# Patient Record
Sex: FEMALE | Race: WHITE | NOT HISPANIC OR LATINO | Employment: FULL TIME | URBAN - METROPOLITAN AREA
[De-identification: names, ages, dates, MRNs, and addresses within clinical notes are randomized per-mention and may not be internally consistent; named-entity substitution may affect disease eponyms.]

---

## 2022-12-07 ENCOUNTER — TELEPHONE (OUTPATIENT)
Dept: SURGERY | Facility: CLINIC | Age: 47
End: 2022-12-07
Payer: COMMERCIAL

## 2022-12-07 NOTE — TELEPHONE ENCOUNTER
Spoke with patient and appointment scheduled per referral for hemorrhoids,possible banding. Appointment details confirmed verbally over phone. Reminder slip placed in mail.

## 2022-12-19 ENCOUNTER — OFFICE VISIT (OUTPATIENT)
Dept: SURGERY | Facility: CLINIC | Age: 47
End: 2022-12-19
Payer: COMMERCIAL

## 2022-12-19 VITALS
BODY MASS INDEX: 41.01 KG/M2 | HEIGHT: 67 IN | DIASTOLIC BLOOD PRESSURE: 80 MMHG | HEART RATE: 86 BPM | WEIGHT: 261.31 LBS | SYSTOLIC BLOOD PRESSURE: 110 MMHG

## 2022-12-19 DIAGNOSIS — K62.5 RECTAL BLEEDING: ICD-10-CM

## 2022-12-19 DIAGNOSIS — Z86.010 HISTORY OF COLON POLYPS: Primary | ICD-10-CM

## 2022-12-19 PROCEDURE — 99999 PR PBB SHADOW E&M-EST. PATIENT-LVL IV: CPT | Mod: PBBFAC,,, | Performed by: COLON & RECTAL SURGERY

## 2022-12-19 PROCEDURE — 46600 DIAGNOSTIC ANOSCOPY SPX: CPT | Mod: S$PBB,,, | Performed by: COLON & RECTAL SURGERY

## 2022-12-19 PROCEDURE — 99202 OFFICE O/P NEW SF 15 MIN: CPT | Mod: 25,S$PBB,, | Performed by: COLON & RECTAL SURGERY

## 2022-12-19 PROCEDURE — 46600 DIAGNOSTIC ANOSCOPY SPX: CPT | Mod: PBBFAC | Performed by: COLON & RECTAL SURGERY

## 2022-12-19 RX ORDER — SPIRONOLACTONE 100 MG/1
100 TABLET, FILM COATED ORAL
COMMUNITY
Start: 2022-10-13

## 2022-12-19 RX ORDER — LIOTHYRONINE SODIUM 5 UG/1
5 TABLET ORAL 2 TIMES DAILY
COMMUNITY
Start: 2022-10-13

## 2022-12-19 RX ORDER — DICLOFENAC POTASSIUM 50 MG/1
50 TABLET, FILM COATED ORAL 2 TIMES DAILY
COMMUNITY
Start: 2022-10-13

## 2022-12-19 RX ORDER — BUPROPION HYDROCHLORIDE 150 MG/1
150 TABLET ORAL EVERY MORNING
COMMUNITY
Start: 2022-10-13

## 2022-12-19 RX ORDER — CYCLOBENZAPRINE HCL 5 MG
5 TABLET ORAL 2 TIMES DAILY
COMMUNITY
Start: 2022-10-13

## 2022-12-19 RX ORDER — DICLOFENAC POTASSIUM 50 MG/1
TABLET, FILM COATED ORAL
COMMUNITY

## 2022-12-19 RX ORDER — DOCUSATE SODIUM 100 MG/1
100 CAPSULE, LIQUID FILLED ORAL 2 TIMES DAILY
COMMUNITY

## 2022-12-19 RX ORDER — DICYCLOMINE HYDROCHLORIDE 10 MG/1
10 CAPSULE ORAL 2 TIMES DAILY PRN
COMMUNITY
Start: 2022-11-21

## 2022-12-19 RX ORDER — LEVOCETIRIZINE DIHYDROCHLORIDE 5 MG/1
5 TABLET, FILM COATED ORAL NIGHTLY
COMMUNITY

## 2022-12-19 RX ORDER — BUPROPION HYDROCHLORIDE 150 MG/1
TABLET ORAL
COMMUNITY

## 2022-12-19 RX ORDER — LEVOTHYROXINE SODIUM 137 UG/1
137 TABLET ORAL
COMMUNITY
Start: 2022-10-13

## 2022-12-19 RX ORDER — BUTALBITAL, ACETAMINOPHEN AND CAFFEINE 50; 325; 40 MG/1; MG/1; MG/1
1 TABLET ORAL EVERY 4 HOURS PRN
COMMUNITY

## 2022-12-19 NOTE — PROGRESS NOTES
"CRS Office Visit History and Physical    Referring Md:   Jackson Godoy Md  [address]  [address]  [place] [address]    SUBJECTIVE:     Chief Complaint: Hemorrhoid     History of Present Illness:  Course is as follows:  Patient is a 47 y.o. female with pmh of hypothyroidism, Obesity, and IBS-D who presents for evaluation of internal hemorrhoids. She states she has severel years history of rectal bleeding, itching and pain when defecating. She underwent a colonoscopy in 2019, which showed internal hemorrhoids and a polyp. She states she has always suffered from constipation and abdominal pain, which has been relieved with dicyclomine that she started recently. She describes a tearing sensation when defecating. She has 4-5 BM daily that are well formed. She takes a daily stool softener but no laxatives. Past surgical history includes total hysterectomy for endometriosis that had involved colon but did not require resection.    Last Colonoscopy: 2019    Review of patient's allergies indicates:  No Known Allergies    No past medical history on file.  No past surgical history on file.  No family history on file.        Review of Systems:  Review of Systems   Constitutional: Negative.    HENT: Negative.     Eyes: Negative.    Respiratory: Negative.     Cardiovascular: Negative.    Gastrointestinal:  Positive for abdominal pain, blood in stool and constipation.   Genitourinary: Negative.    Musculoskeletal: Negative.    Skin: Negative.    Neurological: Negative.    Endo/Heme/Allergies: Negative.    Psychiatric/Behavioral: Negative.       OBJECTIVE:     Vital Signs (Most Recent)  /80 (BP Location: Left arm, Patient Position: Sitting, BP Method: Large (Automatic))   Pulse 86   Ht 5' 7" (1.702 m)   Wt 118.5 kg (261 lb 4.8 oz)   BMI 40.93 kg/m²     Physical Exam:  General: White female in no distress   Neuro: alert and oriented x 4.  Moves all extremities.     HEENT: no icterus.  Trachea midline  Respiratory: " respirations are even and unlabored  Cardiac: regular rate  Abdomen: Soft, non-tender  Extremities: Warm dry and intact  Skin: no rashes  Anorectal: Grade 1 internal hemorrhoids, non-bleeding, non-thrombosed. No fissure identified. A few external skin tags are present.      ASSESSMENT/PLAN:       47 y.o. female with IBS-D who presents with grade 1 internal hemorrhoids.  RBL would not be a good option for this patient as her hemorrhoids are too small.  Recommend increase in fiber - through supplements and diet.  Recommend colonoscopy as her last was in 2019 and she has had some changes to her bowel habits.    Monique Braxton MD  General Surgery PGY-1     I have interviewed and examined the patient, reviewed the notes and assessments, and/or personally supervised the procedure(s) performed by Fox, and I concur with her/his documentation of Sarah Márquez.  See below addendum for my evaluation and additional findings.  I personally performed the anoscopy.    Brian El MD, FACS, FASCRS  Department of Colon & Rectal Surgery

## 2023-02-01 ENCOUNTER — HOSPITAL ENCOUNTER (OUTPATIENT)
Dept: PREADMISSION TESTING | Facility: HOSPITAL | Age: 48
Discharge: HOME | End: 2023-02-01
Attending: STUDENT IN AN ORGANIZED HEALTH CARE EDUCATION/TRAINING PROGRAM
Payer: COMMERCIAL

## 2023-02-01 NOTE — DISCHARGE INSTRUCTIONS
Colonoscopy Prep Instructions      Date: Thursday   Arrival time:       IMPORTANT: PLEASE READ YOUR INSTRUCTIONS CAREFULLY. FAILURE TO FOLLOW THESE INSTRUCTIONS MAY RESULT IN YOUR PROCEURE BEING             CANCELED,RESCHEDULED, OR REPEATED.            Clear Liquid Diet     The Wednesday before your procedure you will follow a clear liquid diet ALL day.     You may have any of the following:     Water, tea, coffee (decaffeinated or regular)     Soft drinks (regular or sugar free)     Gelatin dessert (plain or flavored)     Juice, Gatorade, Powerade, Crystal Lite, lemonade, limeade, Tony-Aid     Bouillon, clear consommé, 100% fat free beef, chicken, or vegetable broths     Snowballs, popsicles     100% cranberry juice is the only red liquid allowed     Please Avoid the following:     Anything with RED dye     Liquids not specifically listed     Dairy (liquid and powder)     Creamers (liquid and powder)     Alcohol           MIRALAX/DULCOLAX PREP Instructions    You are scheduled for a colonoscopy with Dr. Jones on 3/2/23 at [facility].  To ensure that your test is accurate and complete, you MUST follow these instructions listed below.  If you have any questions, please call our office at [phone]  Plan on being at the hospital for your procedure for 3-4 hours.    1.  Follow a CLEAR LIQUID DIET for the entire day before your scheduled colonoscopy.  This means no solid food the entire day starting when you wake.  You may have as much of the clear liquids as you want throughout the day.   CLEAR LIQUID DIET:   - NO RED DYE   - This is anything you can see through   - NO DAIRY   - You can have:  Coffee with sugar (no creamer), tea, water, soda, apple or white grape juice, chicken or beef broth/bouillon (no meat, noodles, or veggies), green/yellow popsicles, green/yellow Jell-O, lemonade.    2.  MIX  A FULL 8.3 OUNCE (238 grams) BOTTLE OF MIRALAX WITH A 64 OUNCE BOTTLE OF GATORADE (or other sports drink).   PUT IN REFRIGERATOR.  This is easier to drink if this solution is cold, so you can mix the solution one day ahead of time and place in the refrigerator prior to drinking.  You have to drink the solution within 24 hours of mixing it.  Do NOT put this solution over ice.  It IS ok to drink with a straw.    3.  AT 12 PM NOON THE DAY BEFORE YOUR PROCEDURE, TAKE 4 DULCOLAX PILLS by mouth with 8 ounces of water.      4. AT 4 PM THE DAY BEFORE YOUR COLONOSCOPY, DRINK ONE (1) 8 OUNCE GLASS OF MIXTURE EVERY 15 MINUTES UNTIL HALF OF THE GATORADE MIXTURE IS CONSUMED.       5.  The exact time to drink the 2nd portion of your prep  will be FIVE HOURS BEFORE YOUR ARRIVAL TIME.     Your estimated morning prep time will be at ________am:    DRINK ONE (1) 8 OUNCE GLASS OF MIXTURE EVERY 15 MINUTES UNTIL THE OTHER HALF IS CONSUMED. After you are done with the prep, nothing else to drink until after your procedure, with the exception of the medications your pre-admit nurse instructs you to take (can be taken with a small amount of water).      6.  You must have someone with you to DRIVE YOU HOME since you will be receiving IV sedation for the colonoscopy.        7.  It is ok to take MOST of your REGULAR MEDICATIONS  in the morning of your test with a SIP of water.  THE ONLY MEDS YOU NEED TO HOLD ARE YOUR DIABETES MEDICATIONS,  SOME BLOOD PRESSURE MEDS, AND BLOOD THINNERS IF OK'D BY YOUR DOCTOR.  Do NOT have anything else to eat or drink the morning of your colonoscopy.  It is ok to brush your teeth.    8.  If you are on blood thinners THAT YOU HAVE BEEN INSTRUCTED TO HOLD BY YOUR DOCTOR FOR THIS PROCEDURE, then do NOT take this the morning of your colonoscopy.  Do NOT stop these medications on your own, they must be approved to be held by your doctor.  Your colonoscopy can NOT be done if you are on these medications.  Examples of blood thinners include: Coumadin, Aggrenox, Plavix, Pradaxa, Reapro, Pletal, Xarelto, Ticagrelor, Brilinta,  Eliquis, and high dose aspirin (325 mg).  You do not have to stop baby aspirin 81 mg.    9.  IF YOU ARE DIABETIC:  NO INSULIN OR ORAL MEDICATIONS THE MORNING OF THE COLONOSCOPY.  TAKE ONLY HALF THE DOSE OF YOUR INSULIN THE DAY BEFORE THE COLONOSCOPY.  DO NOT TAKE ANY ORAL DIABETIC MEDICATIONS THE DAY BEFORE THE COLONOSCOPY.  IF YOU ARE AN INSULIN DEPENDENT DIABETIC WITH UNSTABLE BLOOD SUGARS, NOTIFY YOUR PRIMARY CARE PHYSICIAN FOR INSTRUCTIONS.    10.  Please DO use your inhalers the morning of your procedure.

## 2023-03-02 ENCOUNTER — ANESTHESIA (OUTPATIENT)
Dept: ENDOSCOPY | Facility: HOSPITAL | Age: 48
End: 2023-03-02
Payer: COMMERCIAL

## 2023-03-02 ENCOUNTER — ANESTHESIA EVENT (OUTPATIENT)
Dept: ENDOSCOPY | Facility: HOSPITAL | Age: 48
End: 2023-03-02
Payer: COMMERCIAL

## 2023-03-02 ENCOUNTER — HOSPITAL ENCOUNTER (OUTPATIENT)
Facility: HOSPITAL | Age: 48
Discharge: HOME | End: 2023-03-02
Attending: STUDENT IN AN ORGANIZED HEALTH CARE EDUCATION/TRAINING PROGRAM | Admitting: STUDENT IN AN ORGANIZED HEALTH CARE EDUCATION/TRAINING PROGRAM
Payer: COMMERCIAL

## 2023-03-02 VITALS
TEMPERATURE: 97 F | OXYGEN SATURATION: 98 % | RESPIRATION RATE: 18 BRPM | SYSTOLIC BLOOD PRESSURE: 126 MMHG | DIASTOLIC BLOOD PRESSURE: 76 MMHG | HEART RATE: 84 BPM

## 2023-03-02 DIAGNOSIS — K62.5 RECTAL BLEEDING: Primary | ICD-10-CM

## 2023-03-02 PROCEDURE — 45378 PR COLONOSCOPY,DIAGNOSTIC: ICD-10-PCS | Mod: ,,, | Performed by: STUDENT IN AN ORGANIZED HEALTH CARE EDUCATION/TRAINING PROGRAM

## 2023-03-02 PROCEDURE — 63600175 PHARM REV CODE 636 W HCPCS: Performed by: NURSE ANESTHETIST, CERTIFIED REGISTERED

## 2023-03-02 PROCEDURE — 00811 ANES LWR INTST NDSC NOS: CPT | Mod: QZ,P2 | Performed by: NURSE ANESTHETIST, CERTIFIED REGISTERED

## 2023-03-02 PROCEDURE — 37000008 HC ANESTHESIA 1ST 15 MINUTES: Performed by: STUDENT IN AN ORGANIZED HEALTH CARE EDUCATION/TRAINING PROGRAM

## 2023-03-02 PROCEDURE — 37000009 HC ANESTHESIA EA ADD 15 MINS: Performed by: STUDENT IN AN ORGANIZED HEALTH CARE EDUCATION/TRAINING PROGRAM

## 2023-03-02 PROCEDURE — D9220AH HC ANESTHESIA PROFESSIONAL FEE: Mod: QZ,P2 | Performed by: NURSE ANESTHETIST, CERTIFIED REGISTERED

## 2023-03-02 PROCEDURE — 45378 DIAGNOSTIC COLONOSCOPY: CPT | Mod: ,,, | Performed by: STUDENT IN AN ORGANIZED HEALTH CARE EDUCATION/TRAINING PROGRAM

## 2023-03-02 PROCEDURE — 45378 DIAGNOSTIC COLONOSCOPY: CPT | Performed by: STUDENT IN AN ORGANIZED HEALTH CARE EDUCATION/TRAINING PROGRAM

## 2023-03-02 PROCEDURE — 25000003 PHARM REV CODE 250: Performed by: NURSE ANESTHETIST, CERTIFIED REGISTERED

## 2023-03-02 RX ORDER — LIDOCAINE HYDROCHLORIDE 20 MG/ML
INJECTION, SOLUTION EPIDURAL; INFILTRATION; INTRACAUDAL; PERINEURAL
Status: DISCONTINUED | OUTPATIENT
Start: 2023-03-02 | End: 2023-03-02

## 2023-03-02 RX ORDER — PROPOFOL 10 MG/ML
VIAL (ML) INTRAVENOUS
Status: DISCONTINUED | OUTPATIENT
Start: 2023-03-02 | End: 2023-03-02

## 2023-03-02 RX ADMIN — PROPOFOL 140 MG: 10 INJECTION, EMULSION INTRAVENOUS at 10:03

## 2023-03-02 RX ADMIN — SODIUM CHLORIDE, SODIUM LACTATE, POTASSIUM CHLORIDE, AND CALCIUM CHLORIDE: .6; .31; .03; .02 INJECTION, SOLUTION INTRAVENOUS at 09:03

## 2023-03-02 RX ADMIN — LIDOCAINE HYDROCHLORIDE 60 MG: 20 INJECTION, SOLUTION EPIDURAL; INFILTRATION; INTRACAUDAL; PERINEURAL at 10:03

## 2023-03-02 RX ADMIN — PROPOFOL 50 MG: 10 INJECTION, EMULSION INTRAVENOUS at 10:03

## 2023-03-02 NOTE — TRANSFER OF CARE
Anesthesia Transfer of Care Note    Patient: Sarah Márquez    Procedure(s) Performed: Procedure(s) (LRB):  COLONOSCOPY (N/A)    Patient location: PACU    Anesthesia Type: general    Transport from OR: Transported from OR on 6-10 L/min O2 by face mask with adequate spontaneous ventilation    Post pain: adequate analgesia    Post assessment: no apparent anesthetic complications and tolerated procedure well    Post vital signs: stable    Level of consciousness: sedated    Nausea/Vomiting: no nausea/vomiting    Complications: none    Transfer of care protocol was followed      Last vitals:   Visit Vitals  /76   Pulse 84   Temp 36.2 °C (97.1 °F) (Skin)   Resp 18   SpO2 98%   Breastfeeding No

## 2023-03-02 NOTE — ANESTHESIA PREPROCEDURE EVALUATION
03/02/2023  Sarah Márquez is a 47 y.o., female.      Pre-op Assessment    I have reviewed the Patient Summary Reports.     I have reviewed the Nursing Notes. I have reviewed the NPO Status.   I have reviewed the Medications.     Review of Systems  Anesthesia Hx:  No problems with previous Anesthesia    Social:  Non-Smoker, No Alcohol Use    Hematology/Oncology:  Hematology Normal   Oncology Normal     EENT/Dental:EENT/Dental Normal   Cardiovascular:  Cardiovascular Normal Exercise tolerance: good     Pulmonary:  Pulmonary Normal    Renal/:  Renal/ Normal     Hepatic/GI:  Hepatic/GI Normal    Musculoskeletal:  Musculoskeletal Normal    Neurological:  Neurology Normal    Endocrine:  Endocrine Normal    Dermatological:  Skin Normal    Psych:  Psychiatric Normal           Physical Exam  General: Well nourished    Airway:  Mallampati: II   Mouth Opening: Normal  TM Distance: Normal  Tongue: Normal  Neck ROM: Normal ROM    Chest/Lungs:  Clear to auscultation    Heart:  Rate: Normal  Rhythm: Regular Rhythm  Sounds: Normal        Anesthesia Plan  Type of Anesthesia, risks & benefits discussed:    Anesthesia Type: MAC  Intra-op Monitoring Plan: Standard ASA Monitors  Post Op Pain Control Plan: multimodal analgesia  Induction:  IV  Airway Plan: Direct  Informed Consent: Informed consent signed with the Patient and all parties understand the risks and agree with anesthesia plan.  All questions answered.   ASA Score: 2  Day of Surgery Review of History & Physical: H&P Update referred to the surgeon/provider.I have interviewed and examined the patient. I have reviewed the patient's H&P dated: 3/2/23. There are no significant changes.     Ready For Surgery From Anesthesia Perspective.     .

## 2023-03-02 NOTE — OP NOTE
[facility]  Colon and Rectal Surgery    [address]  [place], [place]  Tel: [phone]  Fax: [phone]  [email or web address]   MD Constantino Samaniego MD Brian Kann, MD W. Forrest Johnston, MD Matthew Giglia, MD Jennifer Paruch, MD William Kethman, MD Danielle Kay, MD     Patient name: Sarah Márquez   YOB: 1975   MRN: [id number]  Date of procedure: 03/02/2023  Referring Physician: Soto Navas MD    Colonoscopy Report    Indication: Rectal bleeding and change in bowel habits    Procedure: Colonoscopy    Findings:  Normal TI, normal colon  Perianal skin tags  Approximate withdrawal time: 11 min    Surgeon: Sixto Jones MD    Procedure:  After pre-operative assessment and review of informed consent, the patient was taken to the procedure room and received monitored anesthesia care. The patient was placed in left lateral decubitus position. A timeout was performed according to [facility] Quality and Safety guidelines.      The endoscope scope was passed under direct vision from the anus to the terminal ileum. Throughout the procedure, the patient's blood pressure, pulse, and oxygen saturations were monitored continuously. The colonoscopy was performed without difficulty. The patient tolerated the procedure well. The quality of the bowel preparation was good . Please see findings as discussed above.     Complications: None  Estimated blood loss: Minimal  Disposition: PACU and then Home      Recommendations:  Repeat exam in 10 years, sooner as needed  Return to referring physicians office as previously scheduled, may follow-up with me as needed  High fiber diet      Sixto Jones MD, FACS - Staff Surgeon  Department of Colon & Rectal Surgery  [facility]

## 2023-03-02 NOTE — ANESTHESIA POSTPROCEDURE EVALUATION
Anesthesia Post Evaluation    Patient: Sarah Márquez    Procedure(s) Performed: Procedure(s) (LRB):  COLONOSCOPY (N/A)    Final Anesthesia Type: general      Patient location during evaluation: PACU  Patient participation: Yes- Able to Participate  Level of consciousness: awake and alert and oriented  Post-procedure vital signs: reviewed and stable  Pain management: adequate  Airway patency: patent    PONV status at discharge: No PONV  Anesthetic complications: no      Cardiovascular status: blood pressure returned to baseline and hemodynamically stable  Respiratory status: unassisted, spontaneous ventilation and room air  Hydration status: euvolemic  Follow-up not needed.          Vitals Value Taken Time   /76 03/02/23 1100   Temp  03/02/23 1201   Pulse 84 03/02/23 1100   Resp 18 03/02/23 1100   SpO2 98 % 03/02/23 1100         Event Time   Out of Recovery 11:05:10         Pain/Fiorella Score: Fiorella Score: 10 (3/2/2023 10:56 AM)

## 2023-03-02 NOTE — H&P
[facility]  Colon and Rectal Surgery    [address]  [place], [place]  Tel: [phone]  Fax: [phone]  [email or web address]   MD Constantino Samaniego MD Brian Kann, MD W. Forrest Johnston, MD Matthew Giglia, MD Jennifer Paruch, MD William Kethman, MD Danielle Kay, MD     Patient name: Sarah Márquez   YOB: 1975   MRN: [id number]  Date of procedure: 03/02/2023    Procedure: Colonoscopy  Indications: Change in bowel habits and rectal bleeding, she has had a prior colonoscopy for lower pelvic pain and endometriosis, denies family history of CRC    The patient was informed of the availability of a certified  without charge. A certified  was not necessary for this visit.    Sedation plan: MAC  ASA: ASA 2 - Patient with mild systemic disease with no functional limitations    Review of Systems  See above    Past Medical History:   Diagnosis Date    Disorder of thyroid, unspecified      Past Surgical History:   Procedure Laterality Date    colonscopy      HYSTERECTOMY       History reviewed. No pertinent family history.  Social History     Tobacco Use    Smoking status: Never    Smokeless tobacco: Never   Substance Use Topics    Alcohol use: Never    Drug use: Never     Review of patient's allergies indicates:  No Known Allergies    Prior to Admission medications    Medication Sig Start Date End Date Taking? Authorizing Provider   buPROPion (WELLBUTRIN XL) 150 MG TB24 tablet Take 150 mg by mouth every morning. 10/13/22  Yes Historical Provider   cyclobenzaprine (FLEXERIL) 5 MG tablet Take 5 mg by mouth 2 (two) times daily. 10/13/22  Yes Historical Provider   diclofenac (CATAFLAM) 50 MG tablet Take 50 mg by mouth 2 (two) times daily. 10/13/22  Yes Historical Provider   levocetirizine (XYZAL) 5 MG tablet Take 5 mg by mouth every evening.   Yes Historical Provider   liothyronine (CYTOMEL) 5 MCG Tab Take 5 mcg by mouth 2  (two) times daily. 10/13/22  Yes Historical Provider   spironolactone (ALDACTONE) 100 MG tablet Take 100 mg by mouth. 10/13/22  Yes Historical Provider   SYNTHROID 137 mcg Tab tablet Take 137 mcg by mouth. 10/13/22  Yes Historical Provider   buPROPion (WELLBUTRIN XL) 150 MG TB24 tablet     Historical Provider   butalbital-acetaminophen-caffeine -40 mg (FIORICET, ESGIC) -40 mg per tablet Take 1 tablet by mouth every 4 (four) hours as needed for Pain.    Historical Provider   diclofenac (CATAFLAM) 50 MG tablet     Historical Provider   dicyclomine (BENTYL) 10 MG capsule Take 10 mg by mouth 2 (two) times daily as needed. 11/21/22   Historical Provider   docusate sodium (COLACE) 100 MG capsule Take 100 mg by mouth 2 (two) times daily.    Historical Provider   linaCLOtide (LINZESS) 72 mcg Cap capsule     Historical Provider   polyethylene glycol (COLYTE) 240-22.72-6.72 -5.84 gram SolR Take by mouth once as needed (follow preadmit packet for instructions on use). 3/1/23 3/1/23  Sixto Jones MD       Physical Examination  BP (!) 140/78   Pulse 90   Temp 97.1 °F (36.2 °C) (Skin)   Resp 18   SpO2 100%   Breastfeeding No      Constitutional: well developed, no cough, no dyspnea, alert, and no acute distress    Head: Normocephalic, no lesions, without obvious abnormality  Eye: Normal external eye, conjunctiva, and lids, PERRL  Respiratory: normal air entry  Gastrointestinal: soft, non-tender, without masses or organomegaly  Neurologic: alert, oriented, normal speech, no focal findings or movement disorder noted  Psychiatric: appropriate, normal mood    Plan of Care    It was a pleasure meeting Ms. Márquez today - we will plan to perform a colonoscopy with monitored anesthesia care. The details of the procedure, the possible need for biopsy or polypectomy and the potential risks including bleeding, perforation, missed polyps were discussed in detail and they consented to undergo the  procedure.      Sixto Jones MD, FACS - Staff Surgeon  Department of Colon & Rectal Surgery  [facility]

## 2023-03-21 ENCOUNTER — HOSPITAL ENCOUNTER (EMERGENCY)
Facility: HOSPITAL | Age: 48
Discharge: HOME | End: 2023-03-21
Attending: EMERGENCY MEDICINE
Payer: COMMERCIAL

## 2023-03-21 VITALS
BODY MASS INDEX: 40.73 KG/M2 | OXYGEN SATURATION: 100 % | RESPIRATION RATE: 18 BRPM | TEMPERATURE: 99 F | HEART RATE: 106 BPM | WEIGHT: 259.5 LBS | HEIGHT: 67 IN | SYSTOLIC BLOOD PRESSURE: 135 MMHG | DIASTOLIC BLOOD PRESSURE: 90 MMHG

## 2023-03-21 DIAGNOSIS — A08.4 VIRAL GASTROENTERITIS: Primary | ICD-10-CM

## 2023-03-21 PROCEDURE — 99284 EMERGENCY DEPT VISIT MOD MDM: CPT

## 2023-03-21 PROCEDURE — 96372 THER/PROPH/DIAG INJ SC/IM: CPT | Performed by: EMERGENCY MEDICINE

## 2023-03-21 PROCEDURE — 63600175 PHARM REV CODE 636 W HCPCS: Performed by: EMERGENCY MEDICINE

## 2023-03-21 RX ORDER — ONDANSETRON 4 MG/1
4 TABLET, ORALLY DISINTEGRATING ORAL EVERY 6 HOURS PRN
Qty: 20 TABLET | Refills: 0 | Status: SHIPPED | OUTPATIENT
Start: 2023-03-21 | End: 2023-03-26

## 2023-03-21 RX ORDER — METOCLOPRAMIDE HYDROCHLORIDE 5 MG/ML
10 INJECTION INTRAMUSCULAR; INTRAVENOUS ONCE
Status: DISCONTINUED | OUTPATIENT
Start: 2023-03-21 | End: 2023-03-21

## 2023-03-21 RX ORDER — METOCLOPRAMIDE HYDROCHLORIDE 5 MG/ML
10 INJECTION INTRAMUSCULAR; INTRAVENOUS ONCE
Status: COMPLETED | OUTPATIENT
Start: 2023-03-21 | End: 2023-03-21

## 2023-03-21 RX ADMIN — METOCLOPRAMIDE 10 MG: 5 INJECTION, SOLUTION INTRAMUSCULAR; INTRAVENOUS at 09:03

## 2023-03-21 NOTE — Clinical Note
"Sarah "Sarah" Yulisa was seen and treated in our emergency department on 3/21/2023.  She may return to work on 03/23/2023.       If you have any questions or concerns, please don't hesitate to call.      Donya FLORES    "

## 2023-03-22 NOTE — ED NOTES
MD reviewed dc instructions with patient, patient verbalized understanding. AVS printed and given. Refer to MD notes for patient assessment.

## 2023-03-22 NOTE — ED PROVIDER NOTES
Encounter Date: 3/21/2023       History     Chief Complaint   Patient presents with    Diarrhea     Patient arrived to Ed with c/o n/v/d and abdominal pain starting this evening. Pt reports  was seen in ED this morning for stomach virus. NADN in triage. Pain 8/10     46 YO FEMALE WHO COMES IN TODAY DUE TO NAUSEA, VOMITING, AND DIARRHEA.  SHE STATES THAT THE SYMPTOMS STARTED ON TODAY.  HER  WAS DIAGNOSED WITH A VIRAL GASTROENTERITIS EARLIER TODAY.        Review of patient's allergies indicates:  No Known Allergies  Past Medical History:   Diagnosis Date    Disorder of thyroid, unspecified      Past Surgical History:   Procedure Laterality Date    COLONOSCOPY N/A 3/2/2023    Procedure: COLONOSCOPY;  Surgeon: Sixto Jones MD;  Location: [facility];  Service: Colon and Rectal;  Laterality: N/A;    colonscopy      HYSTERECTOMY       History reviewed. No pertinent family history.  Social History     Tobacco Use    Smoking status: Never    Smokeless tobacco: Never   Substance Use Topics    Alcohol use: Never    Drug use: Never     Review of Systems   Constitutional: Negative.    HENT: Negative.     Eyes: Negative.    Respiratory: Negative.     Cardiovascular: Negative.    Gastrointestinal:  Positive for diarrhea, nausea and vomiting.   Genitourinary: Negative.    Musculoskeletal: Negative.    Skin: Negative.    Psychiatric/Behavioral: Negative.       Physical Exam     Initial Vitals [03/21/23 2119]   BP Pulse Resp Temp SpO2   136/84 (!) 112 20 99.1 °F (37.3 °C) 98 %      MAP       --         Physical Exam    Nursing note and vitals reviewed.  Constitutional: She appears well-developed and well-nourished.   HENT:   Head: Normocephalic and atraumatic.   Eyes: EOM are normal. Pupils are equal, round, and reactive to light.   Neck: Neck supple.   Normal range of motion.  Cardiovascular:  Regular rhythm.           Pulmonary/Chest: Breath sounds normal.   Abdominal: Abdomen is soft.   Musculoskeletal:          General: Normal range of motion.      Cervical back: Normal range of motion and neck supple.     Neurological: She is alert and oriented to person, place, and time.   Skin: Skin is warm.   Psychiatric: She has a normal mood and affect.       ED Course   Procedures  Labs Reviewed - No data to display       Imaging Results    None          Medications   metoclopramide HCl injection 10 mg (10 mg Intramuscular Given 3/21/23 4408)     Medical Decision Making:   Differential Diagnosis:   VIRAL GASTROENTERITIS, FOOD POISONING   ED Management:  48 YO FEMALE WHO COMES IN TODAY DUE TO NAUSEA, VOMITING, AND DIARRHEA  SUSPECT VIRAL GASTROENTERITIS.    EVALUATION IS PENDING.      PATIENT STATES THAT SHE FEELS MUCH BETTER.  NO NAUSEA AND VOMITING WITH PO FLUID CHALLENGE.  HOME TODAY.                         Clinical Impression:   Final diagnoses:  [A08.4] Viral gastroenteritis (Primary)        ED Disposition Condition    Discharge Stable          ED Prescriptions       Medication Sig Dispense Start Date End Date Auth. Provider    ondansetron (ZOFRAN-ODT) 4 MG TbDL Take 1 tablet (4 mg total) by mouth every 6 (six) hours as needed (NAUSEA AND VOMITING). 20 tablet 3/21/2023 3/26/2023 Janene Interiano MD          Follow-up Information       Follow up With Specialties Details Why Contact Info    Soto Navas MD Family Medicine  As needed [address] [place] [id number]  [phone]               Janene Interiano MD  03/21/23 9937